# Patient Record
Sex: MALE | Race: WHITE | Employment: FULL TIME | URBAN - METROPOLITAN AREA
[De-identification: names, ages, dates, MRNs, and addresses within clinical notes are randomized per-mention and may not be internally consistent; named-entity substitution may affect disease eponyms.]

---

## 2017-04-25 ENCOUNTER — APPOINTMENT (OUTPATIENT)
Dept: GENERAL RADIOLOGY | Age: 27
End: 2017-04-25
Attending: FAMILY MEDICINE
Payer: COMMERCIAL

## 2017-04-25 ENCOUNTER — HOSPITAL ENCOUNTER (OUTPATIENT)
Age: 27
Discharge: HOME | End: 2017-04-25
Attending: FAMILY MEDICINE
Payer: COMMERCIAL

## 2017-04-25 VITALS
HEART RATE: 101 BPM | OXYGEN SATURATION: 95 % | BODY MASS INDEX: 21.34 KG/M2 | WEIGHT: 161 LBS | SYSTOLIC BLOOD PRESSURE: 150 MMHG | TEMPERATURE: 100 F | HEIGHT: 73 IN | RESPIRATION RATE: 18 BRPM | DIASTOLIC BLOOD PRESSURE: 88 MMHG

## 2017-04-25 DIAGNOSIS — R05.9 COUGH: ICD-10-CM

## 2017-04-25 DIAGNOSIS — J18.9 COMMUNITY ACQUIRED PNEUMONIA: Primary | ICD-10-CM

## 2017-04-25 DIAGNOSIS — J02.9 ACUTE PHARYNGITIS, UNSPECIFIED ETIOLOGY: ICD-10-CM

## 2017-04-25 DIAGNOSIS — E87.6 HYPOKALEMIA: ICD-10-CM

## 2017-04-25 DIAGNOSIS — R07.9 ACUTE CHEST PAIN: ICD-10-CM

## 2017-04-25 PROCEDURE — 80047 BASIC METABLC PNL IONIZED CA: CPT

## 2017-04-25 PROCEDURE — 87081 CULTURE SCREEN ONLY: CPT | Performed by: FAMILY MEDICINE

## 2017-04-25 PROCEDURE — 93005 ELECTROCARDIOGRAM TRACING: CPT

## 2017-04-25 PROCEDURE — 96365 THER/PROPH/DIAG IV INF INIT: CPT

## 2017-04-25 PROCEDURE — 93010 ELECTROCARDIOGRAM REPORT: CPT

## 2017-04-25 PROCEDURE — 71020 XR CHEST PA + LAT CHEST (CPT=71020): CPT

## 2017-04-25 PROCEDURE — 85025 COMPLETE CBC W/AUTO DIFF WBC: CPT | Performed by: FAMILY MEDICINE

## 2017-04-25 PROCEDURE — 87430 STREP A AG IA: CPT | Performed by: FAMILY MEDICINE

## 2017-04-25 PROCEDURE — 99205 OFFICE O/P NEW HI 60 MIN: CPT

## 2017-04-25 RX ORDER — AZITHROMYCIN 250 MG/1
TABLET, FILM COATED ORAL
Qty: 1 PACKAGE | Refills: 0 | Status: SHIPPED | OUTPATIENT
Start: 2017-04-25 | End: 2017-05-03

## 2017-04-25 RX ORDER — POTASSIUM CHLORIDE 20 MEQ/1
20 TABLET, EXTENDED RELEASE ORAL DAILY
Qty: 7 TABLET | Refills: 0 | Status: SHIPPED | OUTPATIENT
Start: 2017-04-25 | End: 2017-12-18 | Stop reason: ALTCHOICE

## 2017-04-25 RX ORDER — CEFUROXIME AXETIL 250 MG/1
250 TABLET ORAL 2 TIMES DAILY
Qty: 14 TABLET | Refills: 0 | Status: SHIPPED | OUTPATIENT
Start: 2017-04-25 | End: 2017-12-18 | Stop reason: ALTCHOICE

## 2017-04-25 RX ORDER — ACETAMINOPHEN 325 MG/1
650 TABLET ORAL ONCE
Status: COMPLETED | OUTPATIENT
Start: 2017-04-25 | End: 2017-04-25

## 2017-04-25 NOTE — ED INITIAL ASSESSMENT (HPI)
Pt c/o \"burning\" chest pain starting yesterday. States can feel it in center of chest and throat. Also c/o muscle aches. Fever starting today. T-max 102.5. Pt states mild cough. C/o difficulty taking deep breath.       Flew on plane from [facility] 1 tri

## 2017-04-26 NOTE — ED PROVIDER NOTES
Patient Seen in: [address] [facility] END    History   Patient presents with:  Chest Pain  Fever    Stated Complaint: fever; lungs hurt; back itches inside    HPI    This 68-year-old male presents to the office with complaint of dry cough whi Triage Vitals   BP 04/25/17 1832 134/84 mmHg   Pulse 04/25/17 1832 107   Resp 04/25/17 1832 20   Temp 04/25/17 1832 100 °F (37.8 °C)   Temp src 04/25/17 1832 Temporal   SpO2 04/25/17 1832 96 %   O2 Device 04/25/17 1832 None (Room air)       Current:/ radiographs were obtained. PATIENT STATED HISTORY: (As transcribed by Technologist)  Patient states that he has had a cough, fever, shortness of breath, and pain to the middle of his anterior chest since yesterday.     FINDINGS:  Normal heart size and pulm worsen-otherwise 5-7 days for recheck on the pneumonia      Medications Prescribed:  Current Discharge Medication List    START taking these medications    azithromycin (ZITHROMAX Z-MARILUZ) 250 MG Oral Tab  TAKE 2 TABS THE FIRST DAY, THEN ONE TAB DAILY UNTIL

## 2017-04-27 NOTE — ED NOTES
Patient called stating still with fever of 102-last dose of Tylenol at 7 pm, has been taking Tylenol 1000 mg every 4 hours and still feeling weak. When asked, denies chest pain, some short of breath with activity, taking fluids ok and voiding ok.   Above r

## 2017-05-07 PROBLEM — F41.8 ANXIETY WITH DEPRESSION: Status: ACTIVE | Noted: 2017-05-07

## 2017-12-12 ENCOUNTER — TELEPHONE (OUTPATIENT)
Dept: FAMILY MEDICINE CLINIC | Facility: CLINIC | Age: 27
End: 2017-12-12

## 2017-12-12 NOTE — TELEPHONE ENCOUNTER
Called patient at home number to remind of new patient appt tomorrow and there was no answer and unable to leave a message as the voice box was not set up yet.  Tried to call cell number and got a recording that the number was disconnected

## 2017-12-13 ENCOUNTER — OFFICE VISIT (OUTPATIENT)
Dept: FAMILY MEDICINE CLINIC | Facility: CLINIC | Age: 27
End: 2017-12-13

## 2017-12-13 VITALS
BODY MASS INDEX: 25.25 KG/M2 | DIASTOLIC BLOOD PRESSURE: 70 MMHG | TEMPERATURE: 98 F | HEART RATE: 68 BPM | RESPIRATION RATE: 12 BRPM | WEIGHT: 180.38 LBS | SYSTOLIC BLOOD PRESSURE: 122 MMHG | HEIGHT: 71 IN

## 2017-12-13 DIAGNOSIS — M75.81 ROTATOR CUFF TENDINITIS, RIGHT: ICD-10-CM

## 2017-12-13 DIAGNOSIS — B00.1 COLD SORE: Primary | ICD-10-CM

## 2017-12-13 PROCEDURE — 99203 OFFICE O/P NEW LOW 30 MIN: CPT | Performed by: FAMILY MEDICINE

## 2017-12-13 RX ORDER — VALACYCLOVIR HYDROCHLORIDE 500 MG/1
500 TABLET, FILM COATED ORAL 2 TIMES DAILY
Qty: 18 TABLET | Refills: 3 | Status: SHIPPED | OUTPATIENT
Start: 2017-12-13 | End: 2018-09-20

## 2017-12-13 NOTE — PROGRESS NOTES
Patient presents with:  Shoulder Pain: right shoulder   Mouth Cold Sores (respiratory/integumentary)     HPI:   Billy Mcleod is a 28year old male who presents to the office as a new patient to discuss shoulder pains and cold sores.   Right now, he is using Onset   • Arthritis Mother      of fingers   • Other [OTHER] Maternal Grandfather      amputation - frost bite   • Diabetes Paternal Grandmother          Social History  Social History   Marital status: Single  Spouse name: N/A    Years of education: N/A shoulder . Non tender. Certain movements at extreme ROM behind head and back cause a pain in shoulder. R 5th finger - over PIP joint, a slight elevation around the bone. Otherwise normal sensation and ROM and strength.     ASSESSMENT AND PLAN:     Calvin

## 2017-12-18 PROBLEM — Z20.2 HPV EXPOSURE: Status: ACTIVE | Noted: 2017-12-18

## 2018-09-20 ENCOUNTER — TELEPHONE (OUTPATIENT)
Dept: FAMILY MEDICINE CLINIC | Facility: CLINIC | Age: 28
End: 2018-09-20

## 2018-09-20 DIAGNOSIS — B00.1 COLD SORE: ICD-10-CM

## 2018-09-20 RX ORDER — VALACYCLOVIR HYDROCHLORIDE 500 MG/1
500 TABLET, FILM COATED ORAL 2 TIMES DAILY
Qty: 18 TABLET | Refills: 3 | Status: SHIPPED | OUTPATIENT
Start: 2018-09-20 | End: 2020-01-13

## 2018-09-20 NOTE — TELEPHONE ENCOUNTER
Please refill Zalacyclovir to [facility] in [facility] ([place], [place]. Pt would like it refilled today. Please call when sent.

## 2020-01-13 DIAGNOSIS — B00.1 COLD SORE: ICD-10-CM

## 2020-01-13 NOTE — TELEPHONE ENCOUNTER
Refill request for Valacyclovir fails protocol because LOV was 12/13/17. No future appointments. It looks like pt is now seeing Dr Hansel Arellano. Please ask pt if he has a new PCP. If so, please remove Dr Wes Hernandes as PCP.  If pt will continue with

## 2020-01-14 RX ORDER — VALACYCLOVIR HYDROCHLORIDE 500 MG/1
TABLET, FILM COATED ORAL
Qty: 18 TABLET | Refills: 3 | OUTPATIENT
Start: 2020-01-14

## 2020-03-05 PROBLEM — G89.29 CHRONIC BILATERAL LOW BACK PAIN WITHOUT SCIATICA: Status: ACTIVE | Noted: 2020-03-05

## 2020-03-05 PROBLEM — M54.50 CHRONIC BILATERAL LOW BACK PAIN WITHOUT SCIATICA: Status: ACTIVE | Noted: 2020-03-05

## 2021-08-17 PROBLEM — E78.00 PURE HYPERCHOLESTEROLEMIA: Status: ACTIVE | Noted: 2021-08-17

## 2021-11-04 PROBLEM — R49.0 MUSCLE TENSION DYSPHONIA: Status: ACTIVE | Noted: 2021-11-04

## 2021-11-04 PROBLEM — J02.9 SORE THROAT: Status: ACTIVE | Noted: 2021-11-04

## 2022-01-06 ENCOUNTER — TELEPHONE (OUTPATIENT)
Dept: PHYSICAL THERAPY | Facility: HOSPITAL | Age: 32
End: 2022-01-06

## (undated) NOTE — ED AVS SNAPSHOT
[facility] in [address] [place] [address] Drive,[address]    [address] [facility]    Phone:  [phone]    Fax:  [phone]           Irma Dowling   MRN: [id number]    Department:  THE [facility] END   Date of Visit:  4/25/2017 Take 1 tablet (20 mEq total) by mouth daily.             Where to Get Your Medications      [facility] [address], [phone], [address] Si usted tiene algun problema con preciado sequimiento, por favor llame a nuestro adminstrador de barryos al [phone]. Expect to receive an electronic request (by e-mail or text) to complete a self-assessment the day after your visit.   You may also recei Zaina Zambrano [address] ([address] [facility]) [address]. ([address] [facility]) [address]  ([address]) [address] [facility] asthma. Clinical correlation recommended. Dictated by: Kris Suggs MD on 4/25/2017 at 19:34       Approved by:  Kris Suggs MD              Narrative:    PROCEDURE:  XR CHEST PA + LAT CHEST (CPT=71020)     INDICATIONS:  fever; lungs hurt;